# Patient Record
Sex: MALE | Race: BLACK OR AFRICAN AMERICAN | NOT HISPANIC OR LATINO | Employment: OTHER | URBAN - METROPOLITAN AREA
[De-identification: names, ages, dates, MRNs, and addresses within clinical notes are randomized per-mention and may not be internally consistent; named-entity substitution may affect disease eponyms.]

---

## 2024-07-21 ENCOUNTER — APPOINTMENT (OUTPATIENT)
Dept: CT IMAGING | Facility: CLINIC | Age: 39
End: 2024-07-21
Attending: EMERGENCY MEDICINE

## 2024-07-21 ENCOUNTER — HOSPITAL ENCOUNTER (EMERGENCY)
Facility: CLINIC | Age: 39
Discharge: HOME | End: 2024-07-22
Attending: EMERGENCY MEDICINE | Admitting: EMERGENCY MEDICINE

## 2024-07-21 DIAGNOSIS — R20.0 RIGHT SIDED NUMBNESS: Primary | ICD-10-CM

## 2024-07-21 DIAGNOSIS — R44.1 VISUAL HALLUCINATIONS: ICD-10-CM

## 2024-07-21 PROBLEM — F41.9 ANXIETY DISORDER, UNSPECIFIED TYPE: Status: ACTIVE | Noted: 2024-07-21

## 2024-07-21 LAB
ALBUMIN SERPL BCG-MCNC: 4.7 G/DL (ref 3.5–5.2)
ALP SERPL-CCNC: 59 U/L (ref 40–150)
ALT SERPL W P-5'-P-CCNC: 22 U/L (ref 0–70)
ANION GAP SERPL CALCULATED.3IONS-SCNC: 10 MMOL/L (ref 7–15)
AST SERPL W P-5'-P-CCNC: 19 U/L (ref 0–45)
ATRIAL RATE - MUSE: 62 BPM
BASOPHILS # BLD AUTO: 0 10E3/UL (ref 0–0.2)
BASOPHILS NFR BLD AUTO: 0 %
BILIRUB SERPL-MCNC: 0.2 MG/DL
BUN SERPL-MCNC: 10.7 MG/DL (ref 6–20)
CALCIUM SERPL-MCNC: 9.3 MG/DL (ref 8.8–10.4)
CHLORIDE SERPL-SCNC: 105 MMOL/L (ref 98–107)
CREAT SERPL-MCNC: 1.03 MG/DL (ref 0.67–1.17)
DIASTOLIC BLOOD PRESSURE - MUSE: NORMAL MMHG
EGFRCR SERPLBLD CKD-EPI 2021: >90 ML/MIN/1.73M2
EOSINOPHIL # BLD AUTO: 0.1 10E3/UL (ref 0–0.7)
EOSINOPHIL NFR BLD AUTO: 2 %
ERYTHROCYTE [DISTWIDTH] IN BLOOD BY AUTOMATED COUNT: 12.3 % (ref 10–15)
ETHANOL SERPL-MCNC: <0.01 G/DL
GLUCOSE SERPL-MCNC: 88 MG/DL (ref 70–99)
HCO3 SERPL-SCNC: 24 MMOL/L (ref 22–29)
HCT VFR BLD AUTO: 41.3 % (ref 40–53)
HGB BLD-MCNC: 14.6 G/DL (ref 13.3–17.7)
IMM GRANULOCYTES # BLD: 0 10E3/UL
IMM GRANULOCYTES NFR BLD: 0 %
INR PPP: 1.07 (ref 0.85–1.15)
INTERPRETATION ECG - MUSE: NORMAL
LYMPHOCYTES # BLD AUTO: 2.1 10E3/UL (ref 0.8–5.3)
LYMPHOCYTES NFR BLD AUTO: 36 %
MAGNESIUM SERPL-MCNC: 2 MG/DL (ref 1.7–2.3)
MCH RBC QN AUTO: 31.9 PG (ref 26.5–33)
MCHC RBC AUTO-ENTMCNC: 35.4 G/DL (ref 31.5–36.5)
MCV RBC AUTO: 90 FL (ref 78–100)
MONOCYTES # BLD AUTO: 0.4 10E3/UL (ref 0–1.3)
MONOCYTES NFR BLD AUTO: 7 %
NEUTROPHILS # BLD AUTO: 3.2 10E3/UL (ref 1.6–8.3)
NEUTROPHILS NFR BLD AUTO: 55 %
NRBC # BLD AUTO: 0 10E3/UL
NRBC BLD AUTO-RTO: 0 /100
P AXIS - MUSE: 13 DEGREES
PLATELET # BLD AUTO: 268 10E3/UL (ref 150–450)
POTASSIUM SERPL-SCNC: 3.7 MMOL/L (ref 3.4–5.3)
PR INTERVAL - MUSE: 136 MS
PROT SERPL-MCNC: 7.3 G/DL (ref 6.4–8.3)
QRS DURATION - MUSE: 88 MS
QT - MUSE: 412 MS
QTC - MUSE: 418 MS
R AXIS - MUSE: -11 DEGREES
RBC # BLD AUTO: 4.58 10E6/UL (ref 4.4–5.9)
SODIUM SERPL-SCNC: 139 MMOL/L (ref 135–145)
SYSTOLIC BLOOD PRESSURE - MUSE: NORMAL MMHG
T AXIS - MUSE: 14 DEGREES
TROPONIN T SERPL HS-MCNC: 13 NG/L
TSH SERPL DL<=0.005 MIU/L-ACNC: 1.41 UIU/ML (ref 0.3–4.2)
VENTRICULAR RATE- MUSE: 62 BPM
WBC # BLD AUTO: 5.8 10E3/UL (ref 4–11)

## 2024-07-21 PROCEDURE — 93010 ELECTROCARDIOGRAM REPORT: CPT | Performed by: EMERGENCY MEDICINE

## 2024-07-21 PROCEDURE — 258N000003 HC RX IP 258 OP 636: Performed by: EMERGENCY MEDICINE

## 2024-07-21 PROCEDURE — 99285 EMERGENCY DEPT VISIT HI MDM: CPT | Performed by: EMERGENCY MEDICINE

## 2024-07-21 PROCEDURE — 93005 ELECTROCARDIOGRAM TRACING: CPT | Performed by: EMERGENCY MEDICINE

## 2024-07-21 PROCEDURE — 82077 ASSAY SPEC XCP UR&BREATH IA: CPT | Performed by: EMERGENCY MEDICINE

## 2024-07-21 PROCEDURE — 250N000009 HC RX 250: Performed by: EMERGENCY MEDICINE

## 2024-07-21 PROCEDURE — 83735 ASSAY OF MAGNESIUM: CPT | Performed by: EMERGENCY MEDICINE

## 2024-07-21 PROCEDURE — 99285 EMERGENCY DEPT VISIT HI MDM: CPT | Mod: 25 | Performed by: EMERGENCY MEDICINE

## 2024-07-21 PROCEDURE — 99207 PR NO BILLABLE SERVICE THIS VISIT: CPT | Performed by: STUDENT IN AN ORGANIZED HEALTH CARE EDUCATION/TRAINING PROGRAM

## 2024-07-21 PROCEDURE — 85610 PROTHROMBIN TIME: CPT | Performed by: EMERGENCY MEDICINE

## 2024-07-21 PROCEDURE — 84484 ASSAY OF TROPONIN QUANT: CPT | Performed by: EMERGENCY MEDICINE

## 2024-07-21 PROCEDURE — 70450 CT HEAD/BRAIN W/O DYE: CPT

## 2024-07-21 PROCEDURE — 70496 CT ANGIOGRAPHY HEAD: CPT

## 2024-07-21 PROCEDURE — 85025 COMPLETE CBC W/AUTO DIFF WBC: CPT | Performed by: EMERGENCY MEDICINE

## 2024-07-21 PROCEDURE — 96360 HYDRATION IV INFUSION INIT: CPT | Mod: 59 | Performed by: EMERGENCY MEDICINE

## 2024-07-21 PROCEDURE — 80053 COMPREHEN METABOLIC PANEL: CPT | Performed by: EMERGENCY MEDICINE

## 2024-07-21 PROCEDURE — 84443 ASSAY THYROID STIM HORMONE: CPT | Performed by: EMERGENCY MEDICINE

## 2024-07-21 PROCEDURE — 250N000013 HC RX MED GY IP 250 OP 250 PS 637: Performed by: EMERGENCY MEDICINE

## 2024-07-21 PROCEDURE — 250N000011 HC RX IP 250 OP 636: Performed by: EMERGENCY MEDICINE

## 2024-07-21 RX ORDER — OLANZAPINE 5 MG/1
5 TABLET, ORALLY DISINTEGRATING ORAL ONCE
Status: COMPLETED | OUTPATIENT
Start: 2024-07-21 | End: 2024-07-21

## 2024-07-21 RX ORDER — IOPAMIDOL 755 MG/ML
100 INJECTION, SOLUTION INTRAVASCULAR ONCE
Status: COMPLETED | OUTPATIENT
Start: 2024-07-21 | End: 2024-07-21

## 2024-07-21 RX ADMIN — IOPAMIDOL 67 ML: 755 INJECTION, SOLUTION INTRAVENOUS at 21:01

## 2024-07-21 RX ADMIN — OLANZAPINE 5 MG: 5 TABLET, ORALLY DISINTEGRATING ORAL at 19:41

## 2024-07-21 RX ADMIN — SODIUM CHLORIDE 80 ML: 9 INJECTION, SOLUTION INTRAVENOUS at 21:02

## 2024-07-21 RX ADMIN — SODIUM CHLORIDE 1000 ML: 9 INJECTION, SOLUTION INTRAVENOUS at 23:31

## 2024-07-21 ASSESSMENT — COLUMBIA-SUICIDE SEVERITY RATING SCALE - C-SSRS
2. HAVE YOU ACTUALLY HAD ANY THOUGHTS OF KILLING YOURSELF IN THE PAST MONTH?: NO
1. IN THE PAST MONTH, HAVE YOU WISHED YOU WERE DEAD OR WISHED YOU COULD GO TO SLEEP AND NOT WAKE UP?: NO
6. HAVE YOU EVER DONE ANYTHING, STARTED TO DO ANYTHING, OR PREPARED TO DO ANYTHING TO END YOUR LIFE?: NO

## 2024-07-21 ASSESSMENT — ACTIVITIES OF DAILY LIVING (ADL)
ADLS_ACUITY_SCORE: 33
ADLS_ACUITY_SCORE: 35

## 2024-07-21 NOTE — ED TRIAGE NOTES
Pt presents to the ED with family for concerns of some R sided pain from his jaw down into his hip area. Pt states initially it was pain and now it is more of a numbness feeling. Pt has only been sleeping for about 1 hour a night for the past few weeks. Pt is having auditory and visual hallucinations along with paranoia about being poisoned.     Triage Assessment (Adult)       Row Name 07/21/24 8659          Triage Assessment    Airway WDL WDL        Respiratory WDL    Respiratory WDL WDL        Skin Circulation/Temperature WDL    Skin Circulation/Temperature WDL WDL        Cardiac WDL    Cardiac WDL WDL        Peripheral/Neurovascular WDL    Peripheral Neurovascular WDL WDL        Cognitive/Neuro/Behavioral WDL    Cognitive/Neuro/Behavioral WDL WDL        Kaila Coma Scale    Best Eye Response 4-->(E4) spontaneous     Best Motor Response 6-->(M6) obeys commands     Best Verbal Response 5-->(V5) oriented     Kaila Coma Scale Score 15

## 2024-07-21 NOTE — ED PROVIDER NOTES
[facility] EMERGENCY DEPARTMENT ([facility])    7/21/24      ED PROVIDER NOTE    History     Chief Complaint   Patient presents with    Back Pain     All the way from R jaw area down to R hip area, was painful and now it is turning into numbness in leg.     Hallucinations     Visual and auditory for the past few weeks     Insomnia     For the past few weeks, only sleeping about 1 hour a night per family     HPI  Manuel Da Silva is a 39 year old otherwise healthy male who presents to the emergency department with right-sided body numbness, insomnia and hallucinations.  He reports approximately 2 weeks ago he started to feel numbness on the right side of his face, arm and leg.  He denies any weakness.  He has had a mild headache, denies nausea, vomiting, vision changes.  Occasionally he does feel lightheaded.  Denies vertiginous symptoms.  He has not had any recent fall or traumatic injury.  He denies any fevers, neck stiffness or URI symptoms.  Additionally he reports that over the last 2 weeks he has been having both auditory and visual hallucinations.  He is often seeing people who are not there.  For example he recently lost something and reported to his family that 4 people came in and he saw them take the object out of the house.  Additionally he called a friend who has never been to the [place] and asked him why he was here as he felt that he just recently saw the friend here.  He was telling his son earlier not to eat the food because it was poisoned by feces and other harmful substances.  He has not had any prior mental health diagnoses.  He does report that he has not been sleeping for the last week or so and only approximately 1 hour at night.  He denies any suicidal or homicidal ideation.  He denies any drug or alcohol use.  He is here with his first cousin and his cousin's wife.  Recently while driving with his first cousin the patient did not recognize his family member in the car.       This part of the medical record was transcribed by Jm Dwyer Medical Scribe, from a dictation done by Kelly Liz MD.     Physical Exam   BP: 129/86  Pulse: 70  Temp: 97.8  F (36.6  C)  Resp: 16  SpO2: 98 %  Physical Exam  General: patient is alert and oriented and in no acute distress   Head: atraumatic and normocephalic   EENT: moist mucus membranes without tonsillar erythema or exudates, pupils 3 millimeters, equal round and reactive, extraocular movements intact, no nystagmus, sclera anicteric  Neck: supple without meningismus  Cardiovascular: regular rate and rhythm, no murmur appreciated, extremities warm and well perfused, no lower extremity edema  Pulmonary: lungs clear to auscultation bilaterally   Abdomen: soft, non-tender   Musculoskeletal: normal range of motion   Neurological: alert and oriented, moving all extremities symmetrically, CN II-XII intact with the exception of diminished sensation along the right V1, V2 and V3 distribution, no drift in the upper or lower extremities, sensation to light touch is diminished in the right upper and right lower extremity compared to the left, normal gait, per family no dysarthria or aphasia   skin: warm, dry   Psych: Per family speech is normal in rate and tone, does not appear to be responding to internal stimuli though has a slightly anxious affect, denies SI and HI    ED Course, Procedures, & Data      Procedures            EKG Interpretation:      Interpreted by Kelly Liz MD  Time reviewed: 1818  Symptoms at time of EKG: lightheaded   Rhythm: normal sinus   Rate: normal  Axis: normal  Ectopy: none  Conduction: normal  ST Segments/ T Waves: No ST-T wave changes  Q Waves: none  Comparison to prior: No old EKG available    Clinical Impression: normal EKG       No results found for any visits on 07/21/24.  Medications - No data to display  Labs Ordered and Resulted from Time of ED Arrival to Time of ED Departure - No data to display  No orders  to display          Critical care was not performed.     Medical Decision Making  The patient's presentation was of high complexity (an acute health issue posing potential threat to life or bodily function).    The patient's evaluation involved:  ordering and/or review of 3+ test(s) in this encounter (see separate area of note for details)  discussion of management or test interpretation with another health professional (Neurology, [facility])    The patient's management necessitated moderate risk (prescription drug management including medications given in the ED), moderate risk (IV contrast administration), and further care after sign-out to Dr. Alejo (see their note for further management).    Assessment & Plan    Mr. Da Silva is a 39-year-old otherwise healthy male who presents to the emergency department with left-sided numbness, visual hallucinations, paranoia and insomnia.  Currently he is hemodynamically stable, afebrile and in no respiratory distress.  He does report subjective numbness on his right face, arm and leg but do not appreciate other objective neurologic deficits.  He did have a CT of the head and CTA of the head and neck.  Discussed with neurostroke and recommended MRI of the brain with and without contrast which is currently pending.  Baseline laboratory evaluation shows no elevation in white count, no electrolyte abnormalities.  Urine drug screen is pending.  I did discuss with DEC assessors who saw and evaluated the patient as well and will plan for intensive outpatient program.  He was given oral Zyprexa in the emergency department and will plan to discharge to home with Zyprexa if the remainder of his neurologic workup is unremarkable.  Patient signed out pending MRI and neurology follow up.      This part of the medical record was transcribed by Tyron Nicole Scribe, from a dictation done by Kelly Liz MD.     I have reviewed the nursing notes. I have reviewed the findings, diagnosis, plan and  need for follow up with the patient.    New Prescriptions    OLANZAPINE (ZYPREXA) 5 MG TABLET    Take 1 tablet (5 mg) by mouth 2 times daily for 14 days       Final diagnoses:   Visual hallucinations   Right sided numbness       Kelly Andrew MD  [facility] EMERGENCY DEPARTMENT  7/21/2024     Kelly Andrew MD  07/22/24 0002

## 2024-07-22 ENCOUNTER — APPOINTMENT (OUTPATIENT)
Dept: MRI IMAGING | Facility: CLINIC | Age: 39
End: 2024-07-22
Attending: EMERGENCY MEDICINE

## 2024-07-22 ENCOUNTER — TELEPHONE (OUTPATIENT)
Dept: BEHAVIORAL HEALTH | Facility: CLINIC | Age: 39
End: 2024-07-22

## 2024-07-22 VITALS
SYSTOLIC BLOOD PRESSURE: 110 MMHG | HEART RATE: 58 BPM | OXYGEN SATURATION: 100 % | TEMPERATURE: 97.4 F | RESPIRATION RATE: 16 BRPM | DIASTOLIC BLOOD PRESSURE: 73 MMHG

## 2024-07-22 PROCEDURE — A9585 GADOBUTROL INJECTION: HCPCS | Performed by: EMERGENCY MEDICINE

## 2024-07-22 PROCEDURE — 70553 MRI BRAIN STEM W/O & W/DYE: CPT

## 2024-07-22 PROCEDURE — 96361 HYDRATE IV INFUSION ADD-ON: CPT | Performed by: EMERGENCY MEDICINE

## 2024-07-22 PROCEDURE — 255N000002 HC RX 255 OP 636: Performed by: EMERGENCY MEDICINE

## 2024-07-22 RX ORDER — GADOBUTROL 604.72 MG/ML
0.1 INJECTION INTRAVENOUS ONCE
Status: COMPLETED | OUTPATIENT
Start: 2024-07-22 | End: 2024-07-22

## 2024-07-22 RX ORDER — OLANZAPINE 5 MG/1
5 TABLET ORAL 2 TIMES DAILY
Qty: 28 TABLET | Refills: 0 | Status: SHIPPED | OUTPATIENT
Start: 2024-07-22 | End: 2024-08-05

## 2024-07-22 RX ADMIN — GADOBUTROL 9 ML: 604.72 INJECTION INTRAVENOUS at 00:30

## 2024-07-22 ASSESSMENT — ACTIVITIES OF DAILY LIVING (ADL): ADLS_ACUITY_SCORE: 35

## 2024-07-22 NOTE — DISCHARGE INSTRUCTIONS
Scheduled Appointment  Date: Wednesday, 7/24/2024  Time: 9:00 am - 10:00 am  Provider: Shayy Jamil MS, CNP,ELIZABETH,RN  Location[facility] [address] [place], [place] [address], [address], [place] [address]  Phone: [phone]  Type: Medication Mgmt - Initial (In-Person)    Patient Instructions  Interpreters must be covered by insurance. For appointment information, please visit [email or web address]      [facility] will call you to coordinate your neurology follow up appointment. If you don't hear from a representative within 2 business days, please call [phone].

## 2024-07-22 NOTE — CONSULTS
"Diagnostic Evaluation Consultation  Crisis Assessment    Patient Name: Manuel Da Silva  Age:  39 year old  Legal Sex: male  Gender Identity: male  Pronouns:   Race: Black or   Ethnicity: Not  or   Language: Citizen of [place]      Patient was assessed: In person   Crisis Assessment Start Date: 24  Crisis Assessment Start Time:   Crisis Assessment Stop Time:   Patient location: [facility] EMERGENCY DEPARTMENT                             [facility]-E    Referral Data and Chief Complaint  Manuel Da Silva presents to the ED with family/friends. Patient is presenting to the ED for the following concerns: Significant behavioral change, Anxiety, Paranoia.   Factors that make the mental health crisis life threatening or complex are:  Manuel Da Silva is a 39 year old male who presents to the ED due to primary concerns of right-sided body numbness. Additionally, patient endorses mental health concerns of insomnia and hallucinations.  Throughout this assessment, the patient is alert, oriented X4, calm and cooperative. Writer communicated with patient through the help hojade a Citizen of [place] . Patient reports that over the last 2-3 weeks he has been experiencing non-command auditory hallucinations (chatters, familiar voices telling him that he is the [place] president) and visual hallucinations (seeing friends that are in [facility], his  mother, and dragons). Patient reports that the hallucinations does not cause any distress, just that it makes him feel \"confused and annoyed.\" Patient reports that the visual hallucinations become stronger when he closes his eyes. Thus, patient has been refraining from closing his eyes. Subsequently, patient has has only been sleeping for 1-3 hours per night. Additionally, patient has thoughts that people poisoned his food. Reportedly, patient was telling his son not to eat the food because it was poisoned by feces and other harmful substances. " Patient reports that the hallucinations gets better when he meditate and read the Quran. Patient states that he has not experience any hallucinations for about 2 days now. Patient reports that he had an episode of similar symptoms in 11/2023 and the symptoms eventually went away without any active treatment.  He has not had any prior mental health diagnoses.  He denies any suicidal or homicidal ideation.  He denies any drug or alcohol use.    Informed Consent and Assessment Methods  Explained the crisis assessment process, including applicable information disclosures and limits to confidentiality, assessed understanding of the process, and obtained consent to proceed with the assessment.  Assessment methods included conducting a formal interview with patient, review of medical records, collaboration with medical staff, and obtaining relevant collateral information from family and community providers when available: done     Patient response to interventions: eager to participate  Coping skills were attempted to reduce the crisis:  Distraction techniques, listen to the Quran, talked to family members, come to the ED.     History of the Crisis   Patient has no prior mental health diagnosis. Patient reports that he had an episode of similar symptoms in 11/2023 and the symptoms eventually went away without any active treatment. Patient is not engaging in any mental health services. Patient denies history of SI/HI/NSSIB.    Brief Psychosocial History  Family:  , Children yes  Support System:  Sibling(s)  Employment Status:  other (see comments)  Source of Income: Unable to assess     Financial Environmental Concerns:  none  Current Hobbies:  music, outdoor activities, meditation, reading, television/movies/videos  Barriers in Personal Life:  lack of companionship, lack of motivation    Significant Clinical History  Current Anxiety Symptoms:  anxious  Current Depression/Trauma:  difficulty concentrating  Current  Somatic Symptoms:  anxious  Current Psychosis/Thought Disturbance:  inattentive  Current Eating Symptoms:     Chemical Use History:  Alcohol: None  Benzodiazepines: None  Opiates: None  Cocaine: None  Marijuana: None  Other Use: None   Past diagnosis:  No known past diagnosis  Family history:  No known history of mental health or chemical health concerns  Past treatment:  No known formal treatment attempts  Details of most recent treatment:  No history of engagement in mental health services.  Other relevant history:  Patient immigrated to the  from [place] [facility] about 10 years ago. Patient moved to [place] from [place] about 2 months ago, so he could receive support from family. Patient currently lives with his cousin and other family memebers. Patient is a . However, he is taking a leave from work due to the psychosis symptoms.       Collateral Information  Is there collateral information: Yes     Collateral information name, relationship, phone number:  DEVORA DIANA (Cousin)  [phone]    What happened today: He asked to come to the ED because he has pain and hallucinations.     What is different about patient's functioning: He has not been sleeping because of the hallucinations. He also seems more anxious and agitated. No concerns for SI?HI/NSSIB noted. No concerns for substance use noted.     Concern about alcohol/drug use: None     What do you think the patient needs: Unsure      Has patient made comments about wanting to kill themselves/others: no    If d/c is recommended, can they take part in safety/aftercare planning:  yes     Risk Assessment  [place] Suicide Severity Rating Scale Full Clinical Version:  Suicidal Ideation  Q6 Suicide Behavior (Lifetime): no     [place] Suicide Severity Rating Scale Recent:   Suicidal Ideation (Recent)  Q1 Wished to be Dead (Past Month): no  Q2 Suicidal Thoughts (Past Month): no  Level of Risk per Screen: no risks indicated     Suicidal Behavior (Recent)  Actual  Attempt (Past 3 Months): No  Total Number of Actual Attempts (Past 3 Months): 0  Actual Attempt Description (Past 3 Months): N/A  Has subject engaged in non-suicidal self-injurious behavior? (Past 3 Months): No  Interrupted Attempts (Past 3 Months): No  Total Number of Interrupted Attempts (Past 3 Months): 0  Interrupted Attempt Description (Past 3 Months): N/A  Aborted or Self-Interrupted Attempt (Past 3 Months): No  Total Number of Aborted or Self-Interrupted Attempts (Past 3 Months): 0  Aborted or Self-Interrupted Attempt Description (Past 3 Months): N/A  Preparatory Acts or Behavior (Past 3 Months): No  Total Number of Preparatory Acts (Past 3 Months): 0  Preparatory Acts or Behavior Description (Past 3 Months): N/A    Environmental or Psychosocial Events: other life stressors  Protective Factors: Protective Factors: strong bond to family unit, community support, or employment, sense of importance of health and wellness, responsibilities and duties to others, including pets and children, lives in a responsibly safe and stable environment, help seeking, sense of belonging, cultural, spiritual , or Mandaeism beliefs associated with meaning and value in life, optimistic outlook - identification of future goals    Does the patient have thoughts of harming others? Feels Like Hurting Others: no  Previous Attempt to Hurt Others: no  Is the patient engaging in sexually inappropriate behavior?: no    Is the patient engaging in sexually inappropriate behavior?  no        Mental Status Exam   Affect: Appropriate  Appearance: Appropriate  Attention Span/Concentration: Attentive  Eye Contact: Variable    Fund of Knowledge: Appropriate   Language /Speech Content: Fluent  Language /Speech Volume: Normal  Language /Speech Rate/Productions: Normal  Recent Memory: Intact  Remote Memory: Intact  Mood: Anxious  Orientation to Person: Yes   Orientation to Place: Yes  Orientation to Time of Day: Yes  Orientation to Date: Yes    "  Situation (Do they understand why they are here?): Yes  Psychomotor Behavior: Normal  Thought Content: Clear  Thought Form: Goal Directed     Medication  Psychotropic medications:   Medication Orders - Psychiatric (From admission, onward)      None             Current Care Team  Patient Care Team:  System, Provider Not In as PCP - General (Clinic)    Diagnosis  Patient Active Problem List   Diagnosis Code    Anxiety disorder, unspecified type F41.9       Primary Problem This Admission  Active Hospital Problems    *Anxiety disorder, unspecified type        Clinical Summary and Substantiation of Recommendations     Manuel presents to the ED due to primary concerns of right-sided body numbness. Additionally, patient endorses mental health concerns of insomnia and hallucinations. Patient reports that over the last 2-3 weeks he has been experiencing non-command auditory hallucinations (chatters, familiar voices telling him that he is the [facility] president) and visual hallucinations (seeing friends that are in [facility], his  mother, and dragons). Patient reports that the hallucinations do not cause too much distress, just that it makes him feel \"confused and annoyed.\" Patient reports that the visual hallucinations become stronger when he closes his eyes. Thus, patient has been refraining from closing his eyes. Subsequently, patient has has only been sleeping for 1-3 hours per night. Additionally, patient has thoughts that people poisoned his food. Reportedly, patient was telling his son not to eat the food because it was poisoned by feces and other harmful substances. Patient reports that the hallucinations gets better when he meditate and read the Quran. Patient states that he has not experience any hallucinations for about 2 days now. Patient reports that he had an episode of similar symptoms in 2023 and the symptoms eventually went away without any active treatment. He has not had any prior mental health " diagnoses. He denies any suicidal or homicidal ideation. He denies any drug or alcohol use.     After therapeutic assessment, intervention and aftercare planning by ED care team and [facility] and in consultation with attending provider, the patient's circumstances and mental state were appropriate for outpatient management. Patient will be discharged home with family members with plan to follow up with  [facility] support. At this time, the pt is not presenting as an acute risk to self or others due to the following factors: Denies SI intention/HI/NSSIB; able to engage in reality testing; established connections with community resources; verbalizes understanding of how to follow up with community resources; able to engage in safety and aftercare planning, he appeared in no acute distress and was calm and in emotional and behavioral control. After reviewing recommendations and treatment options, patient agreed to follow up with psychiatry. Psychiatry appointment scheduled. Patient agreed to utilize the ED if symptoms worsening.      Patient coping skills attempted to reduce the crisis:  Distraction techniques, listen to the Quran, talked to family members, come to the ED.    Disposition  Recommended disposition: Medication Management        Reviewed case and recommendations with attending provider. Attending Name: Dr. Liz       Attending concurs with disposition: yes       Patient and/or validated legal guardian concurs with disposition:   yes       Final disposition:  discharge    Legal status on admission: Voluntary/Patient has signed consent for treatment    Assessment Details   Total duration spent with the patient: 30 min     CPT code(s) utilized: 85937 - Psychotherapy for Crisis - 60 (30-74*) min    Andria Leon Psychotherapist  [facility] - Triage & Transition Services  Callback: [phone]

## 2024-07-22 NOTE — CONSULTS
"  [facility]    Stroke Telephone Note    I was called by Kelly Andrew on 07/21/24 regarding patient Manuel Da Silva. The patient is a 39 year old male coming with auditory and visual hallucinations for past to weeks, right face , arm and leg numbness associated with headache for past two weeks.Had similar symptoms in 11/2023 which resolved without treatment.    Vitals  BP: 129/86   Pulse: 70   Resp: 16   Temp: 97.8  F (36.6  C)        Imaging Findings  CT head: Normal head CT   CTA head/neck: No LVO  MRI brain w/w/o contrast: No acute stroke    Impression  #Ruled out acute ischemic stroke      Recommendations  - no further stroke work up  - follow up with general neurology outpatient.    My recommendations are based on the information provided over the phone by Manuel Da Silva's in-person providers. They are not intended to replace the clinical judgment of his in-person providers. I was not requested to personally see or examine the patient at this time.     The Stroke Staff is Dr. Odell.    Lin Murrieta MD  Vascular Neurology Fellow    To page me or covering stroke neurology team member, click here: [facility]  Choose \"On Call\" tab at top, then select \"NEUROLOGY/ALL SITES\" from middle drop-down box, press Enter, then look for \"stroke\" or \"telestroke\" for your site.   "

## 2024-07-22 NOTE — ED PROVIDER NOTES
Patient received in signout from Dr. Nguyen.  See her note for further documentation.  Patient presented to the ED with complaint of right-sided numbness.  CT imaging was unremarkable.  Labs are unrevealing as well.  Case was discussed with the neuro/stroke team recommended MRI brain with and without.  Plan on signout is to follow-up with MRI results, discussed with neurology, and disposition accordingly.    MR Brain w/o & w Contrast   Final Result   IMPRESSION:   1.  Motion degraded exam.   2.  No acute findings.         CTA Head Neck with Contrast   Final Result   IMPRESSION:    HEAD CT:   Normal head CT.      HEAD CTA:   Normal CTA [place].      NECK CTA:   Normal neck CTA.         CT Head w/o Contrast   Final Result   IMPRESSION:    HEAD CT:   Normal head CT.      HEAD CTA:   Normal CTA [place].      NECK CTA:   Normal neck CTA.            MRI negative for any acute findings to explain patient's symptoms.  Discussed with the neuro/stroke team who reviewed the images as well.  No indication for further workup from their standpoint.  Patient can follow-up in the general neurology clinic.  Findings and plan of care discussed with the patient.  Educated reasons to return to the ED.     Jesús Alejo DO  07/22/24 0147